# Patient Record
Sex: MALE | Race: OTHER | URBAN - METROPOLITAN AREA
[De-identification: names, ages, dates, MRNs, and addresses within clinical notes are randomized per-mention and may not be internally consistent; named-entity substitution may affect disease eponyms.]

---

## 2020-05-06 NOTE — PATIENT DISCUSSION
Problem: Potential for hypothermia related to immature thermoregulation  Goal:  will maintain body temperature between 97.6 degrees axillary F and 99.6 degrees axillary F in an open crib  Outcome: PROGRESSING AS EXPECTED  Note:   Vital signs WNL in open crib     Problem: Potential for impaired gas exchange  Goal: Patient will not exhibit signs/symptoms of respiratory distress  Outcome: PROGRESSING AS EXPECTED  Note:   Infant respirations WNL. Infant pink, warm, and has a vigorous cry. Infant free from signs of respiratory distress.       Recommended observation.

## 2021-05-20 ENCOUNTER — PREPPED CHART (OUTPATIENT)
Dept: URBAN - METROPOLITAN AREA CLINIC 19 | Facility: CLINIC | Age: 51
End: 2021-05-20

## 2022-03-23 NOTE — PATIENT DISCUSSION
Allergy testing has been ordered in the past through [facility]. Trey Iglesias Patient has deferred the testing.

## 2022-05-23 ASSESSMENT — KERATOMETRY
OS_AXISANGLE_DEGREES: 92
OS_AXISANGLE2_DEGREES: 2
OD_AXISANGLE2_DEGREES: 17
OD_K1POWER_DIOPTERS: 44.50
OS_K2POWER_DIOPTERS: 45.00
OS_K1POWER_DIOPTERS: 44.75
OD_K2POWER_DIOPTERS: 46.25
OD_AXISANGLE_DEGREES: 107

## 2022-05-23 ASSESSMENT — VISUAL ACUITY
OD_CC: 20/25
OU_CC: 20/20
OU_CC: J2
OS_CC: 20/20-1

## 2022-05-23 ASSESSMENT — TONOMETRY
OS_IOP_MMHG: 18
OD_IOP_MMHG: 18